# Patient Record
(demographics unavailable — no encounter records)

---

## 2025-02-17 NOTE — HISTORY OF PRESENT ILLNESS
[de-identified] : Here with  for routine care/[facility] PMH dementia, GERD, HLD, HTN, osteopenia,  Reports diarrhea and rectal irritation. states it is worse with milk.  She has been drinking chocolate milk everynight, also eats ice cream and cheese.  She is complaint with meds and vit d SHe does not walk as much as she should. She is afraid of falling.  She refuses to walk with cane/walker.  She holds on to her   UTD with Neuro- Dr Evans- [place]  Reports trouble hearing,  is concerned. Still has not had hearing test

## 2025-02-17 NOTE — HEALTH RISK ASSESSMENT
[No] : No [Audit-CScore] : 0 [de-identified] : some walking [Never] : Never [None] : None [] :  [Fully functional (bathing, dressing, toileting, transferring, walking, feeding)] : Fully functional (bathing, dressing, toileting, transferring, walking, feeding) [Fully functional (using the telephone, shopping, preparing meals, housekeeping, doing laundry, using] : Fully functional and needs no help or supervision to perform IADLs (using the telephone, shopping, preparing meals, housekeeping, doing laundry, using transportation, managing medications and managing finances) [Reports changes in hearing] : Reports no changes in hearing [Reports changes in vision] : Reports no changes in vision [Reports changes in dental health] : Reports no changes in dental health